# Patient Record
Sex: MALE | Race: WHITE | Employment: FULL TIME | URBAN - METROPOLITAN AREA
[De-identification: names, ages, dates, MRNs, and addresses within clinical notes are randomized per-mention and may not be internally consistent; named-entity substitution may affect disease eponyms.]

---

## 2022-09-21 ENCOUNTER — OFFICE VISIT (OUTPATIENT)
Dept: OCCUPATIONAL MEDICINE | Age: 22
End: 2022-09-21

## 2022-09-21 VITALS
HEIGHT: 66 IN | OXYGEN SATURATION: 97 % | WEIGHT: 150 LBS | SYSTOLIC BLOOD PRESSURE: 122 MMHG | TEMPERATURE: 98.2 F | HEART RATE: 65 BPM | DIASTOLIC BLOOD PRESSURE: 78 MMHG | BODY MASS INDEX: 24.11 KG/M2 | RESPIRATION RATE: 16 BRPM

## 2022-09-21 DIAGNOSIS — J30.2 SEASONAL ALLERGIC RHINITIS, UNSPECIFIED TRIGGER: ICD-10-CM

## 2022-09-21 DIAGNOSIS — H66.001 RIGHT ACUTE SUPPURATIVE OTITIS MEDIA: Primary | ICD-10-CM

## 2022-09-21 PROBLEM — Z86.16 HISTORY OF COVID-19: Status: ACTIVE | Noted: 2022-09-21

## 2022-09-21 RX ORDER — AMOXICILLIN 875 MG/1
875 TABLET, COATED ORAL 2 TIMES DAILY
Qty: 20 TABLET | Refills: 0
Start: 2022-09-21 | End: 2022-10-01

## 2022-09-21 RX ORDER — CETIRIZINE HYDROCHLORIDE 10 MG/1
10 TABLET ORAL DAILY
COMMUNITY

## 2022-09-21 ASSESSMENT — ENCOUNTER SYMPTOMS
NAUSEA: 0
SINUS PAIN: 0
SORE THROAT: 0
SHORTNESS OF BREATH: 0
VOMITING: 0
DIARRHEA: 0
RHINORRHEA: 0

## 2022-09-21 NOTE — PROGRESS NOTES
[facility]  [phone]        Carrie Blancas is a 23 y.o. male     Student reports Rt ear pain and stuffy nose for past 3 days. Worse today. Denies any other s/s. No tx. Denies past hx of frequent ear pain. No anbx use in past year. Review of Systems   Constitutional:  Negative for chills and fever. HENT:  Positive for congestion and ear pain. Negative for rhinorrhea, sinus pain, sneezing and sore throat. Respiratory:  Negative for shortness of breath. Gastrointestinal:  Negative for diarrhea, nausea and vomiting. /78 (Site: Right Upper Arm, Position: Sitting)   Pulse 65   Temp 98.2 °F (36.8 °C) (Temporal)   Resp 16   Ht 5' 6\" (1.676 m)   Wt 150 lb (68 kg)   SpO2 97%   BMI 24.21 kg/m²      Physical Exam  Constitutional:       Appearance: Normal appearance. HENT:      Head: Normocephalic and atraumatic. Right Ear: Hearing and ear canal normal. Tympanic membrane is erythematous. Left Ear: Hearing, tympanic membrane and ear canal normal.      Nose: Congestion present. Mouth/Throat:      Lips: Pink. Mouth: Mucous membranes are moist.      Pharynx: Uvula midline. Comments: Mild pnd  Cardiovascular:      Rate and Rhythm: Normal rate and regular rhythm. Pulmonary:      Effort: Pulmonary effort is normal.      Breath sounds: Normal breath sounds. Neurological:      Mental Status: He is alert and oriented to person, place, and time.    Psychiatric:         Mood and Affect: Mood normal.         Behavior: Behavior normal.          Current Outpatient Medications:     cetirizine (ZYRTEC) 10 MG tablet, Take 10 mg by mouth daily Daily prn for AR, Disp: , Rfl:     amoxicillin (AMOXIL) 875 MG tablet, Take 1 tablet by mouth 2 times daily for 10 days, Disp: 20 tablet, Rfl: 0    albuterol sulfate  (90 Base) MCG/ACT inhaler, Inhale 2 puffs into the lungs every 6 hours as needed (Patient not taking: Reported on 9/21/2022), Disp: , Rfl:            A:    Diagnosis Orders   1. Right acute suppurative otitis media  amoxicillin (AMOXIL) 875 MG tablet      2. Seasonal allergic rhinitis, unspecified trigger            P:      Recommendations: Rxed amoxil BID x 10 days. Can take tylenol per pkg instructions prn/discomfort. Suggested he resume zyrtec and flonase for AR. Follow up: Patient was encouraged to return to the clinic and/or PCP if s/s persist or do not resolve completely. Also advised to seek emergent care if worsening signs and symptoms warrant immediate evaluation including, but not limited to HA, blurred vision, speech disturbance, difficulty with ambulation/gait, numbness, tingling, weakness, syncope, abdominal pain, chest pain, or shortness of breath. *Side effects, adverse effects, risks versus benefits associated with medications prescribed/recommended were discussed with the patient. Patient verbalized understanding and agrees with treatment plan. All questions answered.       * I have reviewed the patient's medication list, past medical, family, social, and surgical history and updated the patient record appropriately      Social History     Socioeconomic History    Marital status: Not on file     Spouse name: Not on file    Number of children: Not on file    Years of education: Not on file    Highest education level: Not on file   Occupational History    Not on file   Tobacco Use    Smoking status: Never    Smokeless tobacco: Never   Substance and Sexual Activity    Alcohol use: Not Currently    Drug use: Never    Sexual activity: Yes     Partners: Female   Other Topics Concern    Not on file   Social History Narrative    Not on file     Social Determinants of Health     Financial Resource Strain: Not on file   Food Insecurity: Not on file   Transportation Needs: Not on file   Physical Activity: Not on file   Stress: Not on file   Social Connections: Not on file   Intimate Partner Violence: Not on file   Housing Stability: Not on file     Past Medical History:   Diagnosis Date    Allergic rhinitis     Asthma     Environmental and seasonal allergies     History of COVID-19     8/2022     Past Surgical History:   Procedure Laterality Date    WISDOM TOOTH EXTRACTION       Family History   Problem Relation Age of Onset    Heart Disease Maternal Grandmother     Alcohol Abuse Maternal Grandmother     Hypertension Maternal Grandfather     Alcohol Abuse Maternal Grandfather     Alcohol Abuse Paternal Grandfather     Cancer Paternal Cousin

## 2023-03-30 ENCOUNTER — OFFICE VISIT (OUTPATIENT)
Dept: OCCUPATIONAL MEDICINE | Age: 23
End: 2023-03-30

## 2023-03-30 VITALS
DIASTOLIC BLOOD PRESSURE: 69 MMHG | TEMPERATURE: 96.1 F | OXYGEN SATURATION: 97 % | SYSTOLIC BLOOD PRESSURE: 101 MMHG | RESPIRATION RATE: 16 BRPM | HEART RATE: 67 BPM

## 2023-03-30 DIAGNOSIS — R05.1 ACUTE COUGH: Primary | ICD-10-CM

## 2023-03-30 DIAGNOSIS — J34.89 STUFFY AND RUNNY NOSE: ICD-10-CM

## 2023-03-30 DIAGNOSIS — J30.2 SEASONAL ALLERGIC RHINITIS, UNSPECIFIED TRIGGER: ICD-10-CM

## 2023-03-30 LAB — SARS-COV-2, POC: NORMAL

## 2023-03-30 RX ORDER — LORATADINE 10 MG/1
10 TABLET ORAL DAILY PRN
COMMUNITY

## 2023-03-30 RX ORDER — FLUTICASONE PROPIONATE 50 MCG
1 SPRAY, SUSPENSION (ML) NASAL DAILY PRN
COMMUNITY

## 2023-03-30 ASSESSMENT — ENCOUNTER SYMPTOMS
DIARRHEA: 0
VOICE CHANGE: 0
ABDOMINAL PAIN: 0
COUGH: 1
TROUBLE SWALLOWING: 0
SORE THROAT: 0
RHINORRHEA: 1
VOMITING: 0
NAUSEA: 0
SINUS PRESSURE: 0
SHORTNESS OF BREATH: 0

## 2023-03-30 NOTE — PROGRESS NOTES
ear effusion is present. Nose: Congestion present. Mouth/Throat:      Lips: Pink. Mouth: Mucous membranes are moist.      Pharynx: Uvula midline. No oropharyngeal exudate or posterior oropharyngeal erythema. Comments: pnd  Cardiovascular:      Rate and Rhythm: Normal rate and regular rhythm. Pulmonary:      Effort: Pulmonary effort is normal.      Breath sounds: Normal breath sounds. Neurological:      Mental Status: He is alert and oriented to person, place, and time. Psychiatric:         Mood and Affect: Mood normal.         Behavior: Behavior normal.        Results for orders placed or performed in visit on 03/30/23   AMB POC SARS-COV-2   Result Value Ref Range    SARS-COV-2, POC Not-Detected Not Detected          ASSESSMENT and PLAN         Diagnosis Orders   1. Acute cough  AMB POC SARS-COV-2      2. Stuffy and runny nose  AMB POC SARS-COV-2      3. Seasonal allergic rhinitis, unspecified trigger              Recommendations: Resume either claritin or zyrtec on daily basis (he usually takes either one) . Resume flonase per pkg  instructions daily for 2 wks. Can add a 5 day course of sudafed per pkg instructions. Discussed need to avoid/limit exposure to known allergens. Wash bed linen regularly. Bathe and change clothes if outside for extended time. Keep windows closed. Ensure adequate daily oral fluid intake. Take OTC allergy medicine daily during season which affects you, or year round for perennial allergies. Discussed that inconsistency with daily allergy medicine can contribute to ear and sinus infections. Follow up: Patient was encouraged to return to the clinic and/or PCP if s/s persist or do not resolve completely.  Also advised to seek emergent care if new or worsening signs and symptoms which warrant immediate evaluation including, but not limited to: headache, vision changes, speech disturbance, difficulty with ambulation/gait, numbness, tingling, weakness, fever

## 2024-02-19 ENCOUNTER — OFFICE VISIT (OUTPATIENT)
Age: 24
End: 2024-02-19

## 2024-02-19 VITALS
HEART RATE: 71 BPM | RESPIRATION RATE: 18 BRPM | SYSTOLIC BLOOD PRESSURE: 127 MMHG | TEMPERATURE: 98.2 F | OXYGEN SATURATION: 98 % | DIASTOLIC BLOOD PRESSURE: 84 MMHG

## 2024-02-19 DIAGNOSIS — J34.89 STUFFY AND RUNNY NOSE: ICD-10-CM

## 2024-02-19 DIAGNOSIS — R05.1 ACUTE COUGH: Primary | ICD-10-CM

## 2024-02-19 LAB
LOT EXPIRE DATE: 9999
LOT KIT NUMBER: NORMAL
SARS-COV-2, POC: NORMAL
VALID INTERNAL CONTROL: YES
VENDOR AND KIT NAME POC: NORMAL

## 2024-02-19 ASSESSMENT — ENCOUNTER SYMPTOMS
DIARRHEA: 0
SORE THROAT: 1
ABDOMINAL PAIN: 0
VOMITING: 0
COUGH: 1
TROUBLE SWALLOWING: 0
RHINORRHEA: 1
NAUSEA: 0
SINUS PRESSURE: 0
SHORTNESS OF BREATH: 0
VOICE CHANGE: 0

## 2024-02-19 NOTE — PROGRESS NOTES
Number of children: Not on file    Years of education: Not on file    Highest education level: Not on file   Occupational History    Not on file   Tobacco Use    Smoking status: Never    Smokeless tobacco: Never   Substance and Sexual Activity    Alcohol use: Not Currently    Drug use: Never    Sexual activity: Yes     Partners: Female   Other Topics Concern    Not on file   Social History Narrative    Not on file     Social Determinants of Health     Financial Resource Strain: Not on file   Food Insecurity: Not on file   Transportation Needs: Not on file   Physical Activity: Not on file   Stress: Not on file   Social Connections: Not on file   Intimate Partner Violence: Not on file   Housing Stability: Not on file     Past Medical History:   Diagnosis Date    Allergic rhinitis     Asthma     Environmental and seasonal allergies     History of COVID-19     8/2022     Past Surgical History:   Procedure Laterality Date    WISDOM TOOTH EXTRACTION       Family History   Problem Relation Age of Onset    Heart Disease Maternal Grandmother     Alcohol Abuse Maternal Grandmother     Hypertension Maternal Grandfather     Alcohol Abuse Maternal Grandfather     Alcohol Abuse Paternal Grandfather     Cancer Paternal Cousin      There are no Patient Instructions on file for this visit.

## 2024-10-08 ENCOUNTER — OFFICE VISIT (OUTPATIENT)
Age: 24
End: 2024-10-08

## 2024-10-08 VITALS
HEART RATE: 79 BPM | OXYGEN SATURATION: 97 % | RESPIRATION RATE: 18 BRPM | DIASTOLIC BLOOD PRESSURE: 81 MMHG | TEMPERATURE: 98 F | SYSTOLIC BLOOD PRESSURE: 135 MMHG

## 2024-10-08 DIAGNOSIS — G43.909 MIGRAINE WITHOUT STATUS MIGRAINOSUS, NOT INTRACTABLE, UNSPECIFIED MIGRAINE TYPE: Primary | ICD-10-CM

## 2024-10-08 RX ORDER — SUMATRIPTAN 25 MG/1
25 TABLET, FILM COATED ORAL
Qty: 12 TABLET | Refills: 0 | Status: SHIPPED | OUTPATIENT
Start: 2024-10-08 | End: 2024-10-08

## 2024-10-08 ASSESSMENT — ENCOUNTER SYMPTOMS
DIARRHEA: 0
NAUSEA: 1
ABDOMINAL PAIN: 0

## 2024-10-08 NOTE — PROGRESS NOTES
[address] [address] [phone]    SUBJECTIVE:     César James is a 24 y.o. male     Patient reports migraine that started last night. Reports noise and light sensitivity. Mild nausea- declines zofran Rx ; states nausea is manageable. He took Excedrin extra strength but no relief. States he used to take an abortive medicine in high school but cannot recall the name.            Current Outpatient Medications:     SUMAtriptan (IMITREX) 25 MG tablet, Take 1 tablet by mouth once as needed for Migraine (take 1 pill immediately for migraine. May repeat x 1 after 2 hours of dose #1 if needed. Max 2 tablets in 24 hours), Disp: 12 tablet, Rfl: 0    fluticasone (FLONASE) 50 MCG/ACT nasal spray, 1 spray by Each Nostril route daily as needed (Patient not taking: Reported on 3/30/2023), Disp: , Rfl:     cetirizine (ZYRTEC) 10 MG tablet, Take 10 mg by mouth daily Daily prn for AR (Patient not taking: Reported on 3/30/2023), Disp: , Rfl:     albuterol sulfate  (90 Base) MCG/ACT inhaler, Inhale 2 puffs into the lungs every 6 hours as needed (Patient not taking: Reported on 9/21/2022), Disp: , Rfl:      Allergies   Allergen Reactions    Other Other (See Comments)     Cat/dog dander and other unknown environmental allergies: Congestion, itching       Review of Systems   Constitutional:  Negative for chills and fever.   Eyes:  Negative for visual disturbance.   Gastrointestinal:  Positive for nausea. Negative for abdominal pain and diarrhea.   Neurological:  Positive for headaches. Negative for dizziness, facial asymmetry, speech difficulty and weakness.         OBJECTIVE:    /81 (Site: Right Upper Arm, Position: Sitting)   Pulse 79   Temp 98 °F (36.7 °C) (Temporal)   Resp 18   SpO2 97%      Physical Exam  Vitals reviewed.   Constitutional:       Appearance: Normal appearance.   HENT:      Head: Normocephalic and atraumatic.      Right Ear: Hearing,

## 2025-04-15 ENCOUNTER — OFFICE VISIT (OUTPATIENT)
Age: 25
End: 2025-04-15

## 2025-04-15 VITALS — HEART RATE: 94 BPM | RESPIRATION RATE: 18 BRPM | TEMPERATURE: 98.2 F | OXYGEN SATURATION: 98 %

## 2025-04-15 DIAGNOSIS — G43.909 MIGRAINE WITHOUT STATUS MIGRAINOSUS, NOT INTRACTABLE, UNSPECIFIED MIGRAINE TYPE: Primary | ICD-10-CM

## 2025-04-15 DIAGNOSIS — Z02.89 ENCOUNTER TO OBTAIN EXCUSE FROM SCHOOL: ICD-10-CM

## 2025-04-15 RX ORDER — SUMATRIPTAN 50 MG/1
50 TABLET, FILM COATED ORAL
Qty: 9 TABLET | Refills: 0 | Status: SHIPPED | OUTPATIENT
Start: 2025-04-15

## 2025-04-15 ASSESSMENT — ENCOUNTER SYMPTOMS
VOMITING: 0
NAUSEA: 1
ABDOMINAL PAIN: 0
DIARRHEA: 0

## 2025-04-15 NOTE — PROGRESS NOTES
[address] [address] [phone]    SUBJECTIVE:     César James is a 24 y.o. male     Student reports he developed a migraine after lacrosse practice last night. He took his last remaining sumatriptan 25 mg last night and it helped the migraine but did not completed abort it. He requested higher dosage and excuse note for today. Reports improving nausea, light and noise sensitivity.     Reports he has been hydrating well and eating normally           Current Outpatient Medications:     SUMAtriptan (IMITREX) 50 MG tablet, Take 1 tablet by mouth once as needed for Migraine (take one pill when migraine begins. May repeat x 1 more dose after 2 hours of 1st dose if symptoms not resolved. Max 2 pills in 24 hours), Disp: 9 tablet, Rfl: 0    fluticasone (FLONASE) 50 MCG/ACT nasal spray, 1 spray by Each Nostril route daily as needed (Patient not taking: Reported on 3/30/2023), Disp: , Rfl:     cetirizine (ZYRTEC) 10 MG tablet, Take 10 mg by mouth daily Daily prn for AR (Patient not taking: Reported on 3/30/2023), Disp: , Rfl:     albuterol sulfate  (90 Base) MCG/ACT inhaler, Inhale 2 puffs into the lungs every 6 hours as needed (Patient not taking: Reported on 9/21/2022), Disp: , Rfl:      Allergies   Allergen Reactions    Other Other (See Comments)     Cat/dog dander and other unknown environmental allergies: Congestion, itching       Review of Systems   Constitutional:  Negative for fever.   Eyes:  Negative for visual disturbance.   Gastrointestinal:  Positive for nausea. Negative for abdominal pain, diarrhea and vomiting.   Neurological:  Positive for headaches.         OBJECTIVE:    Pulse 94   Temp 98.2 °F (36.8 °C) (Temporal)   Resp 18   SpO2 98%      Physical Exam  Vitals reviewed.   Constitutional:       General: He is not in acute distress.     Appearance: Normal appearance. He is not ill-appearing.   HENT:      Head: Normocephalic and